# Patient Record
Sex: MALE | Race: WHITE | NOT HISPANIC OR LATINO | Employment: FULL TIME | URBAN - METROPOLITAN AREA
[De-identification: names, ages, dates, MRNs, and addresses within clinical notes are randomized per-mention and may not be internally consistent; named-entity substitution may affect disease eponyms.]

---

## 2023-09-06 PROCEDURE — 99283 EMERGENCY DEPT VISIT LOW MDM: CPT

## 2023-09-07 ENCOUNTER — APPOINTMENT (OUTPATIENT)
Dept: GENERAL RADIOLOGY | Facility: HOSPITAL | Age: 38
End: 2023-09-07
Payer: OTHER MISCELLANEOUS

## 2023-09-07 ENCOUNTER — HOSPITAL ENCOUNTER (EMERGENCY)
Facility: HOSPITAL | Age: 38
Discharge: HOME | End: 2023-09-07
Attending: EMERGENCY MEDICINE
Payer: OTHER MISCELLANEOUS

## 2023-09-07 VITALS
HEART RATE: 60 BPM | WEIGHT: 140 LBS | OXYGEN SATURATION: 97 % | RESPIRATION RATE: 18 BRPM | TEMPERATURE: 98 F | HEIGHT: 73 IN | BODY MASS INDEX: 18.55 KG/M2 | DIASTOLIC BLOOD PRESSURE: 80 MMHG | SYSTOLIC BLOOD PRESSURE: 116 MMHG

## 2023-09-07 DIAGNOSIS — S29.019A THORACIC MYOFASCIAL STRAIN, INITIAL ENCOUNTER: Primary | ICD-10-CM

## 2023-09-07 PROCEDURE — 72072 X-RAY EXAM THORAC SPINE 3VWS: CPT

## 2023-09-07 RX ORDER — IBUPROFEN 600 MG/1
600 TABLET ORAL EVERY 8 HOURS PRN
Qty: 21 TABLET | Refills: 0 | Status: SHIPPED | OUTPATIENT
Start: 2023-09-07

## 2023-09-07 RX ORDER — METHOCARBAMOL 750 MG/1
750 TABLET, FILM COATED ORAL 3 TIMES DAILY
Qty: 21 TABLET | Refills: 0 | Status: SHIPPED | OUTPATIENT
Start: 2023-09-07 | End: 2023-09-14

## 2023-09-07 RX ORDER — METHOCARBAMOL 750 MG/1
750 TABLET, FILM COATED ORAL ONCE
Status: COMPLETED | OUTPATIENT
Start: 2023-09-07 | End: 2023-09-07

## 2023-09-07 RX ORDER — IBUPROFEN 600 MG/1
600 TABLET ORAL ONCE
Status: COMPLETED | OUTPATIENT
Start: 2023-09-07 | End: 2023-09-07

## 2023-09-07 RX ADMIN — IBUPROFEN 600 MG: 600 TABLET ORAL at 03:40

## 2023-09-07 RX ADMIN — METHOCARBAMOL 750 MG: 750 TABLET ORAL at 03:40

## 2023-09-07 NOTE — ED PROVIDER NOTES
Subjective   History of Present Illness  This is a 37-year-old male presented to the emergency department with some thoracic back pain.  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  States it was in his mid thoracic region.  States sharp stabbing pain.  Been consistent in nature.  Is worse when he tries to rotate his thoracic spine.  Patient does not sustain any other injuries.  Does not have any fevers or chills.  No headache or change in vision.  No focal weakness.  No chest pain or shortness of breath.  No abdominal pain or vomiting.    History provided by:  Patient   used: No      Review of Systems   Constitutional:  Negative for chills and fever.   HENT:  Negative for congestion, ear pain and sore throat.    Eyes:  Negative for visual disturbance.   Respiratory:  Negative for shortness of breath.    Cardiovascular:  Negative for chest pain.   Gastrointestinal:  Negative for abdominal pain.   Genitourinary:  Negative for difficulty urinating.   Musculoskeletal:  Positive for back pain. Negative for arthralgias.   Skin:  Negative for rash.   Neurological:  Negative for dizziness, weakness and numbness.   Psychiatric/Behavioral:  Negative for agitation.      No past medical history on file.    Allergies   Allergen Reactions    Sesame Oil Anaphylaxis    Sesame Seed Anaphylaxis       No past surgical history on file.    No family history on file.    Social History     Socioeconomic History    Marital status: Single           Objective   Physical Exam  Vitals and nursing note reviewed.   Constitutional:       General: He is not in acute distress.     Appearance: He is not ill-appearing or toxic-appearing.   HENT:      Mouth/Throat:      Pharynx: No posterior oropharyngeal erythema.   Eyes:      Extraocular Movements: Extraocular movements intact.      Pupils: Pupils are equal, round, and reactive to light.   Cardiovascular:      Rate and Rhythm: Normal rate  and regular rhythm.   Pulmonary:      Effort: Pulmonary effort is normal. No respiratory distress.   Abdominal:      General: Abdomen is flat. There is no distension.      Palpations: There is no mass.      Tenderness: There is no abdominal tenderness. There is no guarding or rebound.   Musculoskeletal:         General: No deformity. Normal range of motion.      Comments: Patient has some tenderness to palpation in the mid thoracic spine.  Is most prominent in the left lateral paraspinal region.  Patient elicits pain but some lateral rotation.  There is no midline tenderness, step-off or deformity.   Neurological:      General: No focal deficit present.      Mental Status: He is alert.      Sensory: No sensory deficit.      Motor: No weakness.       Procedures           ED Course  ED Course as of 09/07/23 0417   Thu Sep 07, 2023   0416 BP(!): 140/113 [JK]   0416 Temp: 98 °F (36.7 °C) [JK]   0416 Temp src: Oral [JK]   0416 Heart Rate: 100 [JK]   0416 Resp: 18 [JK]   0416 SpO2: 100 % [JK]   0416 Device (Oxygen Therapy): room air  Patient's repeat vitals, telemetry tracing, and pulse oximetry tracing were directly viewed and interpreted by myself.  Patient hemodynamically stable [JK]   0416 XR Spine Thoracic 3 View  Imaging was directly visualized by myself, per my interpretations, x-ray of the thoracic spine was normal. [JK]   0416 The patient is currently afebrile.  They do not have any midline spinal tenderness.  There are no focal neurologic deficits.  Patient is not an IV drug abuser.  No pulsatile mass.  No urinary retention or incontinence.  No saddle anesthesia.  No loss of sensation.  Due to these findings, there is low risk for epidural abscess, AAA or other significant spinal cord pathology.    On reevaluation, the patient's pain is improved.  The patient is able to ambulate without difficulty.  Neurologic examination is completely unchanged.  [JK]   0416 I had a discussion with the patient/family regarding  diagnosis, diagnostic results, treatment plan, and medications. The patient/family indicated understanding of these instructions. I spent adequate time at the bedside prior to discharge necessary to discuss the aftercare instructions, giving patient education, providing explanations of the results of our evaluations/findings, and my decision making to assure that the patient/family understand the plan of care. Time was allotted to answer questions at that time and throughout the ED course. Patient is required to maintain timely follow up, as discussed. I also discussed the potential for the development of an acute emergent condition requiring further evaluation, return to the ER, admission, or even surgical intervention.  I encouraged the patient to return to the emergency department immediately for any concerns, worsening symptoms, new complaints, or if symptoms persist and they are unable to seek follow-up in a timely fashion. The patient/family expressed understanding and agreement with this plan    Shared decision making:   After full review of the patient's clinical presentation, review of any work-up including but not limited to laboratory studies and radiology obtained, I had a discussion with the patient.  Treatment options were discussed as well as the risks, benefits and consequences.  I discussed all findings with the patient and family members if available.  During the discussion, treatment goals were understood by all as well as any misconceptions which were addressed with the patient.  Ample time was given for any questions they may have had.  They are in agreement with the treatment plan as well as final disposition. [JK]      ED Course User Index  [JK] Christopher Guzmán MD                                           Medical Decision Making  This is a 37-year-old male presenting with some thoracic back pain.  Patient was lifting a dog at work when he had subacute onset pain.  Symptoms seem most  consistent with thoracic strain possibly herniated disc.  The patient is no evidence of neurologic deficit.  He is provided symptomatic treatment.  Work-up initiated.      Differential diagnosis: Thoracic strain, strain, contusion, fracture, herniated disc      Amount and/or Complexity of Data Reviewed  Radiology: ordered and independent interpretation performed. Decision-making details documented in ED Course.    Risk  Prescription drug management.        Final diagnoses:   Thoracic myofascial strain, initial encounter       ED Disposition  ED Disposition       ED Disposition   Discharge    Condition   Stable    Comment   --               [facility] - [place] [address]  [phone]  Call in 1 day           Medication List        New Prescriptions      ibuprofen 600 MG tablet  Commonly known as: ADVIL,MOTRIN  Take 1 tablet by mouth Every 8 (Eight) Hours As Needed for Mild Pain (for moderate severity pain).     methocarbamol 750 MG tablet  Commonly known as: ROBAXIN  Take 1 tablet by mouth 3 (Three) Times a Day for 7 days.               Where to Get Your Medications        These medications were sent to [facility] [id number] - [place], [place] - [address] [place] - [phone]  - [phone]   [address] [address]      Phone: [phone]   ibuprofen 600 MG tablet  methocarbamol 750 MG tablet            Christopher Guzmán MD  09/07/23 6908